# Patient Record
Sex: MALE | Race: WHITE
[De-identification: names, ages, dates, MRNs, and addresses within clinical notes are randomized per-mention and may not be internally consistent; named-entity substitution may affect disease eponyms.]

---

## 2017-01-16 ENCOUNTER — HOSPITAL ENCOUNTER (OUTPATIENT)
Dept: HOSPITAL 75 - CARD | Age: 57
End: 2017-01-16
Attending: INTERNAL MEDICINE
Payer: COMMERCIAL

## 2017-01-16 DIAGNOSIS — I25.10: ICD-10-CM

## 2017-01-16 DIAGNOSIS — I48.91: Primary | ICD-10-CM

## 2017-01-16 DIAGNOSIS — I42.9: ICD-10-CM

## 2017-01-16 PROCEDURE — 93306 TTE W/DOPPLER COMPLETE: CPT

## 2017-01-16 NOTE — XMS REPORT
Continuity of Care Document

 Created on: 2016



LEEANN LAKE

External Reference #: [id number]

: 1960

Sex: Male



Demographics







 Address  [address] [place]  [address]

 

 Home Phone  [phone]

 

 Preferred Language  English

 

 Marital Status  Unknown

 

 Zoroastrianism Affiliation  Unknown

 

 Race  Unknown

 

 Ethnic Group  Unknown





Author







 Author  [facility]

 

 Organization  [facility]

 

 Address  Unknown

 

 Phone  Unavailable







Support







 Name  Relationship  Address  Phone

 

 SRINI GANN MD  Caregiver  [address] [place]  [address] [phone]

 

 UNKNOWN  Caregiver  Unknown  Unavailable

 

 SB LAKE  Next Of Kin  [address]

[place]  [address] [phone]







Care Team Providers







 Care Team Member Name  Role  Phone

 

 UNKNOWN  PCP  Unavailable







Insurance Providers







 Payer Name  Policy Number  Subscriber Name  Relationship

 

 [facility]  [id number]  HERRERA Lake Self / Same As Patient







Advance Directives







 Directive  Response  Recorded Date/Time

 

 Advance Directives  No  16 5:59pm

 

 Health Care Power of   No  16 5:59pm

 

 Organ Donor  Yes  16 5:59pm

 

 Resuscitation Status  Full Code  16 5:59pm







Chief Complaint and Reason for Visit







 Chief Complaint  STEMI

 

 Reason for Visit  Acute myocardial infarction due to left coronary artery 
occlusion

Atrial fibrillation with RVR

Cardiomyopathy







Problems

Active Problems







 Medical Problem  Onset Date  Status

 

 Acute myocardial infarction due to left coronary artery occlusion  Unknown  
Resolved

 

 Atrial fibrillation with RVR  Unknown  Resolved

 

 Cardiomyopathy  Unknown  Acute







Medications

Current Home Medications







 Medication  Dose  Units  Route  Directions  Days/Qty  Instructions  Start Date

 

 Apixaban 5 Mg  5  Mg  Oral  Twice A Day  30 Days     16

 

 Clopidogrel Bisulfate 75 Mg  75  Mg  Oral  Daily  30 Days     16

 

 Atorvastatin Calcium 80 Mg  80  Mg  Oral  Bedtime  30 Days     16

 

 Metoprolol Tartrate 25 Mg  25  Mg  Oral  Twice A Day  30 Days     16







Social History







 Social History Problem  Response  Recorded Date/Time

 

 Alcohol Use  Rarely Uses  2016 6:05pm

 

 Recreational Drug Use  No  2016 6:05pm

 

 Recent Foreign Travel  No  2016 6:11pm

 

 Recent Infectious Disease Exposure  No  2016 6:11pm

 

 Hospitalization with Isolation  Denies  2016 3:47pm

 

 Smoking Status  Never a Smoker  2016 6:07pm

 

 Recent Hopitalizations  No  2016 6:05pm

 

 Hospitalization with Isolation  Denies  2016 3:47pm









 Query  Response  Start Date  Stop Date

 

 Smoking Status  Never a Smoker      







Hospital Discharge Instructions

 

Patient Instructions

Physician Instructions

 

Follow Up/Plan

Follow up with Dr Gann in 7-10 days.

CARDIAC CATH DISCHARGE INSTRUCTIONS

 

*Hold Metformin for 48 hours post heart cath.

 

ACTIVITY

 

* Go Home directly and rest.

* Limit activity of the leg (or wrist if it was used) for 7 days including 

aerobics, swimming,

   jogging, bicycling, etc.

* Restrict stair-climbing for 7 days if possible, if not, climb up with your 

non-cath leg, then

   bring together on the same step.

* Avoid lifting, pushing, pulling or excessive movement of the affected 

extremity for 7 days.

* Customary sexual activity may be resumed after 2 days-use caution not to use a

position  

   that strains or causes pain to the affected extremity.

* No driving for 24 hours.

* NO SMOKING. 

* Avoid straining for bowel movements for 7 days.

* Gentle walking on level ground is allowed.

* Returning to work will depend on the type of procedure and the results. Your 

doctor will discuss

   this with you.

 

CALL YOUR DOCTOR FOR ANY OF THE FOLLOWING:

 

*If bleeding from the puncture site occurs- Apply gentle pressure to site with 

clean cloth and call

   your doctor or EMS.

* If a knot or lump forms under the skin, increases in size, or causes pain.

* If bruising appears to be worsening or moving further down your leg instead of

disappearing.

* Temperature above 101 F.

 

CARE OF YOUR GROIN INCISION;

 

* Bruising or purple discoloration of the skin near the puncture site is common.

* You may shower only, no bathtub bathing for 5 days.  Be careful to avoid 

slipping as your

   leg may feel stiff.

* If a closure device was used on your femoral artery, please see the attached 

guide regarding

   care of the device and your leg.

* REMOVE the dressing from your groin the next day after your procedure in the 

shower.

 

CARE OF YOUR WRIST INCISION;

 

* Bruising or purple discoloration of the skin near the puncture site is common.

* You may shower.

* DO NOT submerge wrist.

* Remove dressing in 24 hours.

 

 



Plan of Care







 Discharge Date  16 1:30pm

 

 Disposition  09 ADMITTED AS INPATIENT

 

 Instructions/Education Provided  CARDIAC CATH DISCHARGE INSTRUC

 

 Prescriptions  See Medication Section

 

 Referrals  SRINI GANN MD (Unspecified) - 16 

Address:

 [address] [place] [id number] [phone]



Reason(s) for Referral:

You are scheduled for a follow up appointment with Dr. Gann on Dec. 12 th at

 11 am

 

 Care Plan and Goals  See Discharge Instructions Section







Functional Status







 Query  Response  Date Recorded

 

 Patient Orientation  Person

Place

Time

Situation

  December 3, 2016 3:47pm

 

 Comprehension Ability  Understands Concepts

  2016 9:00pm







Allergies, Adverse Reactions, Alerts

No known allergies.



Immunizations







 Name  Given  Type

 

 FLU TRIvalent 5 years - Adult  16  Administered







Vital Signs

Acute Vital Signs





 Vital  Response  Date/Time

 

 Temperature (Fahrenheit)  98.6 degrees F (97.6 - 99.5)  2016 12:00pm

 

 Temperature (Calculated Celsius)  37.20449 degrees C (36.4 - 37.5)  2016 
12:00pm

 

 Temperature Source  Tympanic  2016 12:00pm

 

 Pulse Rate (adult)  89 bpm (60 - 90)  2016 1:00pm

 

 Respiratory Rate  16 bpm (12 - 24)  2016 3:42am

 

 O2 Sat by Pulse Oximetry  99 % (88 - 100)  2016 3:42am

 

 Blood Pressure  102/73 mm Hg  2016 3:42am

 

    Blood Pressure Mean  83 mm Hg  2016 3:42am

 

 Pain      

 

    Numeric Pain Scale  0-No Pain  2016 12:00pm

 

 Height (Feet)  5 feet  2016 6:11pm

 

 Height (Inches)  11.00 inches  2016 6:11pm

 

 Height (Calculated Centimeters)  180.064255 cm  2016 6:11pm

 

 Weight (Pounds)  202 pounds  2016 6:33am

 

 Weight (Ounces)  3.2 oz  2016 6:33am

 

 Weight (Calculated Grams)  29988.378 gm  2016 6:33am

 

 Weight (Calculated Kilograms)  91.756341 kilograms  2016 6:33am

 

 Calculated BMI  28.6  2016 6:11pm

 

 Capillary Refill      

 

    Capillary Refill  Less Than 3 Seconds  2016 7:00pm







Results

Laboratory Results







 Test Name  Result  Units  Flags  Reference  Collection Date/Time  Result Date/
Time  Comments

 

 White Blood Count  8.8  10^3/uL     4.3-11.0  2016 9:45am  2016 9:
56am   

 

 Red Blood Count  4.78  10^6/uL     4.35-5.85  2016 9:45am  2016 9:
56am   

 

 Hemoglobin  15.0  G/DL     13.3-17.7  2016 9:45am  2016 9:56am   

 

 Hematocrit  44  %     40-54  2016 9:45am  2016 9:56am   

 

 Mean Corpuscular Volume  92  FL     80-99  2016 9:45am  2016 9:
56am   

 

 Mean Corpuscular Hemoglobin  31  PG     25-34  2016 9:45am  2016 9:
56am   

 

 Mean Corpuscular Hemoglobin Concent  34  G/DL     32-36  2016 9:45am  2016 9:56am   

 

 Red Cell Distribution Width  12.9  %     10.0-14.5  2016 9:45am  2016 9:56am   

 

 Platelet Count  192  10^3/uL     130-400  2016 9:45am  2016 9:56am
   

 

 Mean Platelet Volume  9.5  FL     7.4-10.4  2016 9:45am  2016 9:
56am   

 

 Neutrophils (%) (Auto)  75  %     42-75  2016 9:45am  2016 9:56am 
  

 

 Lymphocytes (%) (Auto)  7  %  L  12-44  2016 9:45am  2016 9:56am   

 

 Monocytes (%) (Auto)  18  %  H  0-12  2016 9:45am  2016 9:56am   

 

 Eosinophils (%) (Auto)  1  %     0-10  2016 9:45am  2016 9:56am   

 

 Basophils (%) (Auto)  0  %     0-10  2016 9:45am  2016 9:56am   

 

 Neutrophils # (Auto)  6.6  X 10^3     1.8-7.8  2016 9:45am  2016 9:
56am   

 

 Lymphocytes # (Auto)  0.6  X 10^3  L  1.0-4.0  2016 9:45am  2016 9:
56am   

 

 Monocytes # (Auto)  1.5  X 10^3  H  0.0-1.0  2016 9:45am  2016 9:
56am   

 

 Eosinophils # (Auto)  0.1  10^3/uL     0.0-0.3  2016 9:45am  2016 9
:56am   

 

 Basophils # (Auto)  0.0  10^3/uL     0.0-0.1  2016 9:45am  2016 9:
56am   

 

 Neutrophils % (Manual)  76  %        2016 9:45am  2016 10:12am   

 

 Band Neutrophils  0  %        2016 9:45am  2016 10:12am   

 

 Lymphocytes % (Manual)  7  %        2016 9:45am  2016 10:12am   

 

 Monocytes % (Manual)  16  %        2016 9:45am  2016 10:12am   

 

 Eosinophils % (Manual)  1  %        2016 9:45am  2016 10:12am   

 

 Basophils % (Manual)  0  %        2016 9:45am  2016 10:12am   

 

 Blood Morphology Comment  NORMAL           2016 9:45am  2016 10:
12am   

 

 Sodium Level  136  MMOL/L     135-145  2016 9:45am  2016 10:19am   

 

 Potassium Level  4.4  MMOL/L     3.6-5.0  2016 9:45am  2016 10:
19am   

 

 Chloride Level  106  MMOL/L       2016 9:45am  2016 10:19am 
  

 

 Carbon Dioxide Level  24  MMOL/L     21-32  2016 9:45am  2016 10:
19am   

 

 Anion Gap  6  MMOL/L     5-14  2016 9:45am  2016 10:19am   

 

 Blood Urea Nitrogen  12  MG/DL     7-18  2016 9:45am  2016 10:19am
   

 

 Creatinine  0.84  MG/DL     0.60-1.30  2016 9:45am  2016 10:19am   

 

 BUN/Creatinine Ratio  14           2016 9:45am  2016 10:19am   

 

 Estimat Glomerular Filtration Rate  > 60           2016 9:45am  2016 10:19am                    GFR INTERPRETIVE DATA  

  

         UNITS FOR ESTIMATED GFR (eGFR): mL/min/1.73 M2  

         REFERENCE RANGE FOR ESTIMATED GFR (eGFR)  

                                          eGFR  

         NORMAL eGFR                       >60  

         MODERATELY DECREASED eGFR          30-59  

         SEVERLY DECREASED eGFR             15-29  

         KIDNEY FAILURE                    <15 (OR DIALYSIS)  

 

 Glucose Level  96  MG/DL       2016 9:45am  2016 10:19am   

 

 Calcium Level  8.4  MG/DL  L  8.5-10.1  2016 9:45am  2016 10:19am 
  

 

 Total Bilirubin  0.9  MG/DL     0.1-1.0  2016 9:45am  2016 10:19am
   

 

 Alkaline Phosphatase  63  U/L       2016 9:45am  2016 10:
19am   

 

 Aspartate Amino Transf (AST/SGOT)  156  U/L  H  5-34  2016 9:45am  2016 10:19am   

 

 Alanine Aminotransferase (ALT/SGPT)  61  U/L  H  0-55  2016 9:45am   11:24am   

 

 Troponin I  58.99  NG/ML  CH  <0.30  2016 9:45am  2016 10:40am  
RESULT CALLED TO ASHUTOSH AT 1039.  RESULTS READ BACK:YES.

 

 B-Type Natriuretic Peptide  236.1  PG/ML  H  <100.0  2016 9:45am  2016 10:26am   

 

 Total Protein  5.4  G/DL  L  6.4-8.2  2016 9:45am  2016 10:19am   

 

 Albumin  3.3  G/DL     3.2-4.5  2016 9:45am  2016 10:19am   







Procedures







 Procedure  Status  Date  Provider(s)

 

 Transesophageal echocardiography with cardioversion  Active  16  SRINI GANN MD

 

 Tracing only of electrocardiogram  Completed  16  SRINI GANN MD

 

 Tracing only of electrocardiogram  Completed  16  SRINI GANN MD

 

 Color Doppler echocardiography  Active  16  SRINI GANN MD

 

 Tracing only of electrocardiogram  Active  16  SRINI GANN MD

 

 Tracing only of electrocardiogram  Active  16  SRINI GANN MD







Encounters







 Encounter  Location  Arrival/Admit Date  Discharge/Depart Date  Attending 
Provider

 

 Discharged Inpatient  [facility]  16 5:48pm   1:30pm  SRINI GANN MD











 Recent Diagnosis

 

 Acute myocardial infarction due to left coronary artery occlusion

 

 Atrial fibrillation with RVR

 

 Cardiomyopathy

## 2017-01-17 NOTE — ECHOCARDIOGRAPHY REPORT
PROCEDURE PHYSICIAN:   CRISTAL GANN

 

DATE OF PROCEDURE:  

01/16/2017

 

TWO DIMENSIONAL ECHOCARDIOGRAM REPORT 

 

PRIMARY PHYSICIAN:       

OTHER PHYSICIAN:         

REFERRING PHYSICIAN:          

ORDERING PHYSICIAN:      

ATTENDING PHYSICIAN:     Dr. Walker Gann

FAMILY PHYSICIAN:        

READING PHYSICIAN:            

 

INDICATION FOR THE PROCEDURE:  Atrial fibrillation,

cardiomyopathy, CAD.

 

MEASUREMENTS                  DERIVED VALUES 

 

LV DIAMETER (LAX)   NORMALS                       NORMALS

Diastolic           (3.6-5.2)      Eject. Fract.  (60%+/-6%)    

  

Systolic            (2.3-3.9)      Diastolic Vol.      

% Shortening        (0.22-0.42)    Systolic Vol.       

                                   Aortic Root         

IVS THICKNESS 

Diastolic           (0.6-1.1)

 

LVPW THICKNESS 

Diastolic           (0.6-1.1)

 

LA DIAMETER 

Systolic            (2.1-3.7)  

 

FINDINGS:

1.   Sinus rhythm. 

2.   Left atrial dimensions are enlarged. Left atrial diameter is

5.5 cm. 

3.   Aortic root dimensions are normal. 

4.   Left ventricular systolic function is mildly reduced. Left

ventricular ejection fraction is 35 to 40%. Mild concentric LVH

is present. Diastolic intraventricular septal diameter is 1.4 cm.

 

5.   There is akinetic distal anterior and apical wall. The rest

of the walls have normal wall motion. 

6.   Right heart dimension is normal. Right ventricular systolic

function is preserved. 

7.   There is a small concentric pericardial effusion with no

tamponade physiology. 

8.   There is mild diastolic dysfunction. 

9.   IVC is normal. 

 

VALVULAR STRUCTURE OF THE HEART:  

 

There is mild tricuspid regurgitation with mild mitral

regurgitation. RVSP is 50 mmHg.  The aortic valve has no

significant thickening. There is mild aortic insufficiency. The

pulmonic valve is within normal limits. 

 

CONCLUSION:

1.   Mild LV systolic dysfunction is noted with an LV EF of 35 to

40%. Compared to previous echocardiogram done on 11/30/2016, 

there is improvement in ejection fraction from 30 to 35% to 35 to

40%. 

2.   Left atrial enlargement. 

3.   Mild to moderate pulmonary hypertension. 

4.   Distal anterior and apical akinesis. 

5.   Small pericardial effusion with no tamponade. 

6.   No significant valvular heart disease. 

 

 

 

Job ID: [id number]

Dictated Date: 01/17/2017 10:27:00 

Transcription Date: 01/17/2017 11:06:36 / billy

## 2017-03-31 ENCOUNTER — HOSPITAL ENCOUNTER (OUTPATIENT)
Dept: HOSPITAL 75 - CARD | Age: 57
End: 2017-03-31
Attending: INTERNAL MEDICINE
Payer: COMMERCIAL

## 2017-03-31 DIAGNOSIS — I48.0: Primary | ICD-10-CM

## 2017-03-31 PROCEDURE — 93306 TTE W/DOPPLER COMPLETE: CPT

## 2017-04-05 NOTE — ECHOCARDIOGRAPHY REPORT
PROCEDURE PHYSICIAN:   CRISTAL GANN

 

DATE OF PROCEDURE:  03/31/2017

 

TWO DIMENSIONAL ECHOCARDIOGRAM REPORT 

 

PRIMARY PHYSICIAN:       

OTHER PHYSICIAN:         

REFERRING PHYSICIAN:          

ORDERING PHYSICIAN:      

ATTENDING PHYSICIAN:     Dr. CATALINO Gann 

FAMILY PHYSICIAN:        

READING PHYSICIAN:            

 

INDICATION FOR THE PROCEDURE:  

1.   Atrial fibrillation.  

2.   Cardiomyopathy.  

 

MEASUREMENTS                  DERIVED VALUES 

 

LV DIAMETER (LAX)   NORMALS                       NORMALS

Diastolic           (3.6-5.2)      Eject. Fract.  (60%+/-6%)    

  

Systolic            (2.3-3.9)      Diastolic Vol.      

% Shortening        (0.22-0.42)    Systolic Vol.       

                                   Aortic Root         

IVS THICKNESS 

Diastolic           (0.6-1.1)

 

LVPW THICKNESS 

Diastolic           (0.6-1.1)

 

LA DIAMETER 

Systolic            (2.1-3.7)  

 

 

FINDINGS:

1.   Sinus rhythm. 

2.   Left atrial enlargement is noted. Left atrial diameter is

4.5 cm. 

3.   Aortic root dimensions are normal. 

4.   Left ventricular systolic function is mildly reduced. Left

ventricular ejection fraction is 40 to 45%. Mild concentric LVH

is present. Diastolic intraventricular septal diameter is 1.3 cm.

 

5.   The distal anterior wall is hypokinetic with akinetic apical

wall. The rest of the walls have normal wall motion. 

6.   RV size and function is normal. 

7.   Right heart is normal. 

8.   There is no evidence of pericardial effusion. 

9.   Mild diastolic dysfunction is present. 

10.  IVC diameter is 1.6. 

 

VALVULAR STRUCTURE OF THE HEART: 

There is trace mitral regurgitation with mild tricuspid

regurgitation. RVSP is 10 mmHg.  There is trace aortic

insufficiency with no significant stenosis. 

 

CONCLUSION:

1.   Mildly reduced LV systolic function with an EF of 40 to 45%.

This EF is slightly improved from previous echocardiogram, which

showed an EF of 35 to 40%. 

2.   There is mild concentric LVH and left atrial enlargement. 

3.   Distal and anterior wall hypokinesis with akinetic apex is

noted. 

4.   There is no significant pulmonary hypertension. 

5.   There is no significant valvular heart disease. 

 

 

 

 

Job ID: [id number]

Dictated Date: 04/04/2017 14:49:48 

Transcription Date: 04/05/2017 08:25:40 / mac

## 2023-08-16 ENCOUNTER — HOSPITAL ENCOUNTER (OUTPATIENT)
Dept: HOSPITAL 75 - CARD | Age: 63
End: 2023-08-16
Attending: INTERNAL MEDICINE
Payer: COMMERCIAL

## 2023-08-16 VITALS — WEIGHT: 200.62 LBS | BODY MASS INDEX: 28.09 KG/M2 | HEIGHT: 70.87 IN

## 2023-08-16 VITALS — SYSTOLIC BLOOD PRESSURE: 162 MMHG | DIASTOLIC BLOOD PRESSURE: 96 MMHG

## 2023-08-16 DIAGNOSIS — I25.10: ICD-10-CM

## 2023-08-16 DIAGNOSIS — I10: Primary | ICD-10-CM

## 2023-08-16 PROCEDURE — 78452 HT MUSCLE IMAGE SPECT MULT: CPT

## 2023-08-16 PROCEDURE — 93017 CV STRESS TEST TRACING ONLY: CPT

## 2023-08-16 NOTE — CARDIOLOGY STRESS TEST REPORT
Stress Test Report


Date of Procedure/Referring:


Date of Procedure:  Aug 16, 2023


PCP


No,Local Physician


Admitting Physician


Admitting Physician:


 








Attending Physician:


Iqra Hernandez MD





Baseline Heart Rate:


80





Baseline Blood Pressure:


Blood Pressure Systolic:  162


Blood Pressure Diastolic:  96





Vital Signs








  Date Time  Temp Pulse Resp B/P (MAP) Pulse Ox O2 Delivery O2 Flow Rate FiO2


 


8/16/23 09:41  77  162/96 (118)    








Baseline Vital Signs





Vital Signs








  Date Time  Temp Pulse Resp B/P (MAP) Pulse Ox O2 Delivery O2 Flow Rate FiO2


 


8/16/23 09:41  77  162/96 (118)    











Baseline EKG:


Baseline EKG:  NSR





Summary:


After explaining the procedure and details to the patient, he  signed the 

consent and was brought to the stress nuclear laboratory.





Patient exercised on standard Shaun protocol, EKG, heart rate and blood pressure

were monitored continuously, resting and stress doses of radio tracer were 

injected, imaging was acquired and reviewed in the short axis, horizontal long 

axis and vertical long axis views





Patient was able to exercise for a total of 4  minutes on Shaun protocol,  METs 

5.8


Maximum heart rate 148      


Maximum blood pressure 228/112       


Stress EKG, Minimal nondiagnostic changes


Recovery EKG, Return to baseline


TID:  1.07


SSS:  35


SDS:  0


EF:  29





Conclusion:


Fair exercise tolerance for 4 minutes on standard Shaun protocol, 5.8 METS 

achieving 93% of maximal expected heart rate


Appropriate heart rate response to exercise with hypertensive response to 

exercise with peak blood pressure 228/110 return to baseline during recovery


Abnormal baseline EKG with sinus rhythm and poor R wave progression in the 

anterior leads.  Persisted during test


Fixed defect involving the whole anterior wall anterior apex and inferior apex 

with no reversibility


Dilated left ventricle diffuse left ventricular hypokinesia, akinesia at the 

anterior apical segment and apex, ejection fraction 29%











IQRA HERNANDEZ MD              Aug 16, 2023 15:13